# Patient Record
Sex: FEMALE | Race: WHITE | NOT HISPANIC OR LATINO | Employment: FULL TIME | URBAN - METROPOLITAN AREA
[De-identification: names, ages, dates, MRNs, and addresses within clinical notes are randomized per-mention and may not be internally consistent; named-entity substitution may affect disease eponyms.]

---

## 2018-10-30 ENCOUNTER — OFFICE VISIT (OUTPATIENT)
Dept: OBGYN CLINIC | Facility: CLINIC | Age: 48
End: 2018-10-30
Payer: COMMERCIAL

## 2018-10-30 VITALS — DIASTOLIC BLOOD PRESSURE: 78 MMHG | WEIGHT: 165 LBS | SYSTOLIC BLOOD PRESSURE: 122 MMHG

## 2018-10-30 DIAGNOSIS — N92.1 MENORRHAGIA WITH IRREGULAR CYCLE: ICD-10-CM

## 2018-10-30 DIAGNOSIS — Z01.419 ENCNTR FOR GYN EXAM (GENERAL) (ROUTINE) W/O ABN FINDINGS: ICD-10-CM

## 2018-10-30 PROCEDURE — G0145 SCR C/V CYTO,THINLAYER,RESCR: HCPCS | Performed by: PHYSICIAN ASSISTANT

## 2018-10-30 PROCEDURE — 99202 OFFICE O/P NEW SF 15 MIN: CPT | Performed by: PHYSICIAN ASSISTANT

## 2018-10-30 PROCEDURE — 87624 HPV HI-RISK TYP POOLED RSLT: CPT | Performed by: PHYSICIAN ASSISTANT

## 2018-10-30 PROCEDURE — 58100 BIOPSY OF UTERUS LINING: CPT | Performed by: PHYSICIAN ASSISTANT

## 2018-10-30 PROCEDURE — 88305 TISSUE EXAM BY PATHOLOGIST: CPT | Performed by: PATHOLOGY

## 2018-10-30 RX ORDER — HYDROXYCHLOROQUINE SULFATE 200 MG/1
200 TABLET, FILM COATED ORAL 2 TIMES DAILY
Refills: 3 | COMMUNITY
Start: 2018-08-15

## 2018-10-30 RX ORDER — TRAZODONE HYDROCHLORIDE 50 MG/1
150 TABLET ORAL DAILY
Refills: 3 | COMMUNITY
Start: 2018-10-22

## 2018-10-30 RX ORDER — DULOXETIN HYDROCHLORIDE 60 MG/1
60 CAPSULE, DELAYED RELEASE ORAL DAILY
Refills: 1 | COMMUNITY
Start: 2018-08-26

## 2018-10-30 RX ORDER — NIFEDIPINE 10 MG/1
10 CAPSULE ORAL DAILY
Refills: 6 | COMMUNITY
Start: 2018-10-22 | End: 2019-02-06 | Stop reason: ALTCHOICE

## 2018-10-30 RX ORDER — PREDNISONE 1 MG/1
5 TABLET ORAL DAILY
Refills: 1 | COMMUNITY
Start: 2018-10-23

## 2018-10-30 RX ORDER — TOPIRAMATE 50 MG/1
50 TABLET, FILM COATED ORAL 2 TIMES DAILY
Refills: 5 | COMMUNITY
Start: 2018-09-30

## 2018-10-30 RX ORDER — CYANOCOBALAMIN 1000 UG/ML
INJECTION INTRAMUSCULAR; SUBCUTANEOUS
Refills: 1 | COMMUNITY
Start: 2018-10-23

## 2018-10-30 RX ORDER — BELIMUMAB 200 MG/ML
SOLUTION SUBCUTANEOUS
COMMUNITY
Start: 2018-10-30

## 2018-10-30 NOTE — PROGRESS NOTES
Lakia Yeager  1970    S:  53 y o  female here for a problem visit  She has not been seen here for the past 9 years  She reports that her periods have always been regular and monthly, lasting 2 days of heavy bleeding where she will saturate a tampon every 1-2 hours  5 weeks ago she started with what seemed like a normal period in terms of flow and timing, however, by day 3 she was still bleeding and immediately knew something was wrong  She says she has bled daily for 5 weeks, saturating a tampon every hour, and passing grapefruit sized clots  She had some norethindrone 5mg tablets at home dated from 2007 and she started these two weeks ago by taking them BID x 1 week then QD x 1 week and now she is taking 1/2 tablet daily for the past two days; her bleeding stopped two days ago  She has some left pelvic discomfort/pressure feeling reminiscent of when she had an ovarian mass with elevated CA-125 in the past resulting in a RSO  She is sexually active with her  without pain or bleeding  She has had no Pap in 9 years       Past Medical History:   Diagnosis Date    Aneurysm ([address] )     Basal cell carcinoma     Circulatory disease     Fibromyalgia     Lupus      Family History   Problem Relation Age of Onset    Stroke Mother     Thyroid disease Mother     Rheum arthritis Mother     Heart disease Mother     Atrial fibrillation Mother     Skin cancer Mother     Hypertension Father     Diabetes Father      Social History     Social History    Marital status: /Civil Union     Spouse name: N/A    Number of children: N/A    Years of education: N/A     Social History Main Topics    Smoking status: Current Some Day Smoker    Smokeless tobacco: Never Used    Alcohol use Yes      Comment: socially     Drug use: No    Sexual activity: Not Currently     Partners: Male     Other Topics Concern    None     Social History Narrative    None       O:  /78 (BP Location: Right arm, Patient Position: Sitting, Cuff Size: Standard)   Wt 74 8 kg (165 lb)   LMP 09/21/2018   She appears well and is in no distress  Abdomen is soft and nontender  External genitals are normal without lesions or rashes  Vagina is normal with no bleeding or discharge noted  Cervix appears normal  Pap was obtained and large amount of bleeding was noted with endocervical brush  Uterus is nontender, no palpable masses  Uterus sounds to 8cm, moderate tissue obtained  Right adnexa is nontender, no masses palpable  Left is nodular, tender    We reviewed her normal CBC and TSH from 10/11 which was mid-way through this bleed  A/P:  Abnormal bleeding  Left pelvic pain  Check EMB, pelvic US, Pap with HPV  Finish her Aygestin, aware that she will likely have a bleed following that  If she continues to have abnormal bleeding will strongly pursue [address]

## 2018-11-01 LAB
HPV HR 12 DNA CVX QL NAA+PROBE: NEGATIVE
HPV16 DNA CVX QL NAA+PROBE: NEGATIVE
HPV18 DNA CVX QL NAA+PROBE: NEGATIVE

## 2018-11-02 ENCOUNTER — HOSPITAL ENCOUNTER (OUTPATIENT)
Dept: RADIOLOGY | Facility: HOSPITAL | Age: 48
Discharge: HOME | End: 2018-11-02
Payer: COMMERCIAL

## 2018-11-02 ENCOUNTER — TELEPHONE (OUTPATIENT)
Dept: OBGYN CLINIC | Facility: CLINIC | Age: 48
End: 2018-11-02

## 2018-11-02 DIAGNOSIS — N92.1 MENORRHAGIA WITH IRREGULAR CYCLE: ICD-10-CM

## 2018-11-02 PROCEDURE — 76856 US EXAM PELVIC COMPLETE: CPT

## 2018-11-02 PROCEDURE — 76830 TRANSVAGINAL US NON-OB: CPT

## 2018-11-02 NOTE — TELEPHONE ENCOUNTER
----- Message from Marissa Fajardo PA-C sent at 11/1/2018  6:57 PM EDT -----  Normal result, please call patient and give normal results

## 2018-11-05 ENCOUNTER — TELEPHONE (OUTPATIENT)
Dept: OBGYN CLINIC | Facility: CLINIC | Age: 48
End: 2018-11-05

## 2018-11-05 DIAGNOSIS — N83.209 CYST OF OVARY, UNSPECIFIED LATERALITY: Primary | ICD-10-CM

## 2018-11-05 LAB
LAB AP GYN PRIMARY INTERPRETATION: NORMAL
Lab: NORMAL

## 2018-11-05 NOTE — TELEPHONE ENCOUNTER
Tried to call pt number on file multiple times  Number does not work  Mailed letter to patient to call us back

## 2018-11-05 NOTE — TELEPHONE ENCOUNTER
----- Message from Aissatou Perry PA-C sent at 11/5/2018  7:31 AM EST -----  Please let Richard Schuster know that her ultrasound shows an ovarian cyst - I would like to repeat her ultrasound in 6-8 weeks to document resolution   Thx

## 2018-11-12 ENCOUNTER — TELEPHONE (OUTPATIENT)
Dept: OBGYN CLINIC | Facility: CLINIC | Age: 48
End: 2018-11-12

## 2018-11-12 NOTE — TELEPHONE ENCOUNTER
Her ultrasound shows a left ovarian cyst  This will cause cramping and discomfort until it resolves  If naproxen is not working, can try ibuprofen 800mg q 6 hours taken with food  Heat may also help  If she has unrelenting pain causing nausea and vomiting then she will need to go the ER  This cyst will hopefully resolve over time   Headaches and indigestion should not be related to her cyst 
Let's see how her bleeding does for the next month or two off of progesterone - if she continues to have abnormal bleeding she should let me know
Patient calling received a letter regarding her Ultrasound results, please call patient back 
Spoke with Pt today via phone call  Pt advised to monitor her bleeding for the next month or two off of the Progesterone - if she continues to have abnormal bleeding she should let PENNIE Lipscomb know per Cruz' recommendation  Reiterated to Pt that if she has any questions/concerns to contact office 
Spoke with patient  Patient stopped bleeding two days after she started the progesterone  She is still having severe cramping a 8 out of 10  She is also having headaches and indigestion  She has been taking tums and rolaids for this but no improvement  She is taking Naproxen BID but not helping at all  She wants to know your opinion on what to do for this  Aware of ultrasound results   Will go for 6 weeks follow-up on her cyst 
TREYSALOME    Spoke with Pt today via phone call  Pt informed that her recent ultrasound showed a left ovarian cyst per PENNIE Lipscomb  Pt further informed that said cyst will cause cramping and discomfort until it resolves, if Naproxen is not working can try Ibuprofen 800 mg every 6 hours taken with food as recommended by Cruz  Pt further informed that use of a heating pad may help as recommended by PENNIE Lipscomb  Pt informed that if she has unrelenting pain that is causing nausea and vomiting she should report to [facility] ER per PENNIE Lipscomb' recommendation  Pt further informed that said cyst will hopefully resolve over time, headaches and indigestion should not be related to her cyst   Pt further states she will schedule follow-up ultrasound  Pt states she is no longer bleeding since taking Progesterone  Pt further states she recently finished all of Progesterone  Pt states if PENNIE Lipscomb wants her to continue taking Progesterone, will need refill 
I will STOP taking the medications listed below when I get home from the hospital:  None

## 2018-11-26 ENCOUNTER — TELEPHONE (OUTPATIENT)
Dept: OBGYN CLINIC | Facility: CLINIC | Age: 48
End: 2018-11-26

## 2018-11-26 NOTE — TELEPHONE ENCOUNTER
Pt bleeding EXTREMELY heavy  She had put herself on norethindrone 10/15 and finished it around at about ov  (10/30)  She stopped bleeding about 2 weeks ago - then began again 11/22  Uses 10 pads and 20 tampons a day  Used that many yesterday  Is in bed  Is dizzy and lightheaded but says she can get like this - has Lupus also  Said she bled this badly last mo - then put herself on norethindrone  No nausea - is eating,  Catherine Tomas you told her hgb ok ? ? I cannot find it on chart    ER????  Thanks  Has l ovarian cyst s/p fall of horse today

## 2018-11-28 ENCOUNTER — TELEPHONE (OUTPATIENT)
Dept: OBGYN CLINIC | Facility: CLINIC | Age: 48
End: 2018-11-28

## 2018-12-17 ENCOUNTER — HOSPITAL ENCOUNTER (OUTPATIENT)
Dept: RADIOLOGY | Facility: MEDICAL CENTER | Age: 48
Discharge: HOME | End: 2018-12-17
Payer: COMMERCIAL

## 2018-12-17 DIAGNOSIS — N83.209 CYST OF OVARY, UNSPECIFIED LATERALITY: ICD-10-CM

## 2018-12-17 PROCEDURE — 76856 US EXAM PELVIC COMPLETE: CPT

## 2018-12-17 PROCEDURE — 76830 TRANSVAGINAL US NON-OB: CPT

## 2018-12-20 ENCOUNTER — TELEPHONE (OUTPATIENT)
Dept: OBGYN CLINIC | Facility: CLINIC | Age: 48
End: 2018-12-20

## 2018-12-20 ENCOUNTER — TELEPHONE (OUTPATIENT)
Dept: OBGYN CLINIC | Facility: MEDICAL CENTER | Age: 48
End: 2018-12-20

## 2018-12-20 DIAGNOSIS — N92.0 MENORRHAGIA WITH REGULAR CYCLE: Primary | ICD-10-CM

## 2018-12-20 RX ORDER — TRANEXAMIC ACID 650 1/1
TABLET ORAL
Qty: 30 TABLET | Refills: 3 | Status: SHIPPED | OUTPATIENT
Start: 2018-12-20 | End: 2019-02-06 | Stop reason: ALTCHOICE

## 2018-12-20 NOTE — TELEPHONE ENCOUNTER
----- Message from Kate Cha PA-C sent at 12/20/2018  7:47 AM EST -----  Please let Eva Patel know that her ovarian cyst has resolved

## 2018-12-20 NOTE — TELEPHONE ENCOUNTER
----- Message from Brie Bullard PA-C sent at 12/20/2018  7:47 AM EST -----  Please let Moraima Hickman know that her ovarian cyst has resolved

## 2018-12-20 NOTE — TELEPHONE ENCOUNTER
Pt angry because she said her cyst did not resolve according to the us tech, she was told its still there, told the pt the tech is not a radiologist and therefore should not have told her that, pt then asked to speak to luci, I told her I will have her call back, then she changed her mind and wanted to speak to a DR, explained Eboni Blevins was her provider but I will be happy to make apt with one of our dr's to go over this us  But she really just wants to speak to one, what to do  [place]

## 2018-12-20 NOTE — TELEPHONE ENCOUNTER
Spoke to pt  Reviewed resolution of ovarian cyst and only a corpus luteum seen on the left  She feels certain that she still has the cyst because she saw it on the ultrasound and the tech told her it was the same as last time  Again reviewed with pt results including my review of pictures showing a 1 4cm corpus luteum  Discussed corpus luteum in detail  She is upset because she had a "tumor" on her right ovary as a child and had it removed along with the ovary  She says her previous gynecologist told her that reflux/indigestion is a sign of ovarian cancer and she has a lot of indigestion  Advised I see no sign of ovarian cancer on her US    She is concerned because her periods remain heavy and crampy  She had a normal EMB and pelvic US  She says that norethindrone does stop her bleeding  Discussed norethindrone taken just for her periods is not recommended  Again offered Fabrizio Gamez - she is very interested in proceeding  Discussed Lysteda to slow her bleeding with her next impending menses - she is agreeable  Will order Fabrizio Gamez for pt and contact her when it is here

## 2018-12-26 ENCOUNTER — TELEPHONE (OUTPATIENT)
Dept: OBGYN CLINIC | Facility: CLINIC | Age: 48
End: 2018-12-26

## 2018-12-26 NOTE — TELEPHONE ENCOUNTER
Pt is interested in [facility] iud, please check iud coverage after Jan,2019    Thank you

## 2019-01-07 NOTE — TELEPHONE ENCOUNTER
Spoke with patient, unsure if she wants Carry Reus right now, will notify office is she is interested

## 2019-02-06 ENCOUNTER — OFFICE VISIT (OUTPATIENT)
Dept: OBGYN CLINIC | Facility: CLINIC | Age: 49
End: 2019-02-06
Payer: COMMERCIAL

## 2019-02-06 VITALS — WEIGHT: 165.6 LBS | DIASTOLIC BLOOD PRESSURE: 74 MMHG | SYSTOLIC BLOOD PRESSURE: 120 MMHG

## 2019-02-06 DIAGNOSIS — Z01.419 ENCOUNTER FOR GYNECOLOGICAL EXAMINATION WITHOUT ABNORMAL FINDING: ICD-10-CM

## 2019-02-06 DIAGNOSIS — N93.9 ABNORMAL UTERINE BLEEDING: ICD-10-CM

## 2019-02-06 DIAGNOSIS — Z12.39 SCREENING FOR MALIGNANT NEOPLASM OF BREAST: Primary | ICD-10-CM

## 2019-02-06 PROCEDURE — S0612 ANNUAL GYNECOLOGICAL EXAMINA: HCPCS | Performed by: OBSTETRICS & GYNECOLOGY

## 2019-02-06 RX ORDER — NIFEDIPINE 30 MG/1
30 TABLET, EXTENDED RELEASE ORAL DAILY
Refills: 6 | COMMUNITY
Start: 2019-01-07

## 2019-02-06 RX ORDER — ASPIRIN 325 MG
325 TABLET ORAL DAILY
COMMUNITY

## 2019-02-06 RX ORDER — MEDROXYPROGESTERONE ACETATE 150 MG/ML
150 INJECTION, SUSPENSION INTRAMUSCULAR
Qty: 1 ML | Refills: 3 | Status: SHIPPED | OUTPATIENT
Start: 2019-02-06

## 2019-02-06 NOTE — PROGRESS NOTES
Assessment/Plan:    No problem-specific Assessment & Plan notes found for this encounter  Diagnoses and all orders for this visit:    Screening for malignant neoplasm of breast  -     Mammo screening bilateral w cad; Future    Encounter for gynecological examination without abnormal finding    Other orders  -     NIFEdipine (PROCARDIA XL) 30 mg 24 hr tablet; Take 30 mg by mouth daily  -     aspirin 325 mg tablet; Take 325 mg by mouth daily        Annual examination was completed  Mammogram was ordered  We discussed patient keeping a menstrual calendar and following her menstrual patterns  She remains an excellent candidate for the use of tranexamic acid as well as progestin containing IUDs  Patient also raised a desire to restart Depo-Provera as an option  She has a history of having self dosed her intramuscular injection and I a m  certainly in agreement with her proceeding with that if she wishes a prescription was sent  I have asked the patient to call when she doses her injections so that we can keep her on scheduled she will dose this at a 12 week interval   Patient to return in 1 year or sooner if desired  Subjective:      Patient ID: Jacqueline Jarvis is a 53 y o  female  Patient presents for annual visit  Patient also with secondary issue of history of abnormal bleeding  She did have endometrial biopsy was demonstrated benign endometrium with polyp formation  Pelvic ultrasound was normal  She does have a history of a right salpingo-oophorectomy for a large ovarian mass at age 23  She has had some derangement of her menstrual pattern over this last year  Gynecologic Exam         The following portions of the patient's history were reviewed and updated as appropriate:   She  has a past medical history of Aneurysm ([address] ); Basal cell carcinoma; Circulatory disease; Fibromyalgia; and Lupus    She   Patient Active Problem List    Diagnosis Date Noted    Screening for malignant neoplasm of breast 02/06/2019    Encounter for gynecological examination without abnormal finding 02/06/2019     She  has a past surgical history that includes Tonsillectomy; Cholecystectomy; Ovary surgery; and Appendectomy  Her family history includes Atrial fibrillation in her mother; Diabetes in her father; Heart disease in her mother; Hypertension in her father; Rheum arthritis in her mother; Skin cancer in her mother; Stroke in her mother; Thyroid disease in her mother  She  reports that she has been smoking  She has never used smokeless tobacco  She reports that she drinks alcohol  She reports that she does not use drugs  Current Outpatient Prescriptions   Medication Sig Dispense Refill    aspirin 325 mg tablet Take 325 mg by mouth daily      BENLYSTA 200 MG/ML SOAJ       cyanocobalamin 1,000 mcg/mL INJECT EVERY WEEK, AS DIRECTED  1    DULoxetine (CYMBALTA) 60 mg delayed release capsule Take 60 mg by mouth daily  1    hydroxychloroquine (PLAQUENIL) 200 mg tablet Take 200 mg by mouth 2 (two) times a day  3    NIFEdipine (PROCARDIA XL) 30 mg 24 hr tablet Take 30 mg by mouth daily  6    predniSONE 5 mg tablet Take 5 mg by mouth daily  1    topiramate (TOPAMAX) 50 MG tablet Take 50 mg by mouth 2 (two) times a day  5    traZODone (DESYREL) 50 mg tablet Take 150 mg by mouth daily  3     No current facility-administered medications for this visit        Current Outpatient Prescriptions on File Prior to Visit   Medication Sig    BENLYSTA 200 MG/ML SOAJ     cyanocobalamin 1,000 mcg/mL INJECT EVERY WEEK, AS DIRECTED    DULoxetine (CYMBALTA) 60 mg delayed release capsule Take 60 mg by mouth daily    hydroxychloroquine (PLAQUENIL) 200 mg tablet Take 200 mg by mouth 2 (two) times a day    predniSONE 5 mg tablet Take 5 mg by mouth daily    topiramate (TOPAMAX) 50 MG tablet Take 50 mg by mouth 2 (two) times a day    traZODone (DESYREL) 50 mg tablet Take 150 mg by mouth daily    [DISCONTINUED] NIFEdipine (PROCARDIA) 10 mg capsule Take 10 mg by mouth daily    [DISCONTINUED] Tranexamic Acid (LYSTEDA) 650 MG TABS Two po TID     No current facility-administered medications on file prior to visit  She is allergic to morphine       Review of Systems   All other systems reviewed and are negative  Objective:      /74 (BP Location: Left arm, Patient Position: Sitting, Cuff Size: Standard)   Wt 75 1 kg (165 lb 9 6 oz)   LMP 01/25/2019 (Exact Date)          Physical Exam   Constitutional: She is oriented to person, place, and time  She appears well-developed and well-nourished  HENT:   Head: Normocephalic and atraumatic  Nose: Nose normal    Eyes: Pupils are equal, round, and reactive to light  EOM are normal    Neck: Normal range of motion  Neck supple  No thyromegaly present  Cardiovascular: Normal rate and regular rhythm  Pulmonary/Chest: Effort normal and breath sounds normal  No respiratory distress  Breasts no masses, tenderness, skin changes   Abdominal: Soft  Bowel sounds are normal  She exhibits no distension and no mass  There is no tenderness  Hernia confirmed negative in the right inguinal area and confirmed negative in the left inguinal area  Genitourinary: Vagina normal and uterus normal  No breast swelling, tenderness, discharge or bleeding  Pelvic exam was performed with patient supine  No labial fusion  There is no rash, tenderness, lesion or injury on the right labia  There is no rash, tenderness, lesion or injury on the left labia  Cervix exhibits no motion tenderness, no discharge and no friability  Genitourinary Comments: Ext genitalia nl female w/o lesions  Vag healthy without lesions or discharge  Cervix healthy w/o lesions or discharge  uterus nl size, NT, no mass  Adnexa NT, no mass   Musculoskeletal: Normal range of motion  She exhibits no edema  Lymphadenopathy:        Right: No inguinal adenopathy present  Left: No inguinal adenopathy present     Neurological: She is alert and oriented to person, place, and time  She has normal reflexes  Skin: Skin is warm and dry  No rash noted  Psychiatric: She has a normal mood and affect  Her behavior is normal  Thought content normal    Nursing note and vitals reviewed

## 2022-06-05 ENCOUNTER — OFFICE VISIT (OUTPATIENT)
Dept: URGENT CARE | Facility: MEDICAL CENTER | Age: 52
End: 2022-06-05
Payer: COMMERCIAL

## 2022-06-05 VITALS
HEART RATE: 80 BPM | HEIGHT: 66 IN | SYSTOLIC BLOOD PRESSURE: 116 MMHG | RESPIRATION RATE: 18 BRPM | TEMPERATURE: 97.6 F | OXYGEN SATURATION: 98 % | BODY MASS INDEX: 24.91 KG/M2 | WEIGHT: 155 LBS | DIASTOLIC BLOOD PRESSURE: 72 MMHG

## 2022-06-05 DIAGNOSIS — M25.559 HIP PAIN: Primary | ICD-10-CM

## 2022-06-05 PROCEDURE — 99212 OFFICE O/P EST SF 10 MIN: CPT | Performed by: PHYSICIAN ASSISTANT

## 2022-06-05 RX ORDER — SILDENAFIL CITRATE 20 MG/1
TABLET ORAL
COMMUNITY
Start: 2022-04-18

## 2022-06-05 RX ORDER — PREDNISONE 20 MG/1
20 TABLET ORAL 2 TIMES DAILY WITH MEALS
Qty: 10 TABLET | Refills: 0 | Status: SHIPPED | OUTPATIENT
Start: 2022-06-05 | End: 2022-06-10

## 2022-06-05 RX ORDER — CYCLOBENZAPRINE HCL 5 MG
5 TABLET ORAL
COMMUNITY
Start: 2022-04-22

## 2022-06-05 NOTE — PATIENT INSTRUCTIONS
1  Take prednisone 20mg  Twice daily x 5 days  2  Recommend consult with Rheumatology- info provided  3  ER if symptoms worsen

## 2022-06-05 NOTE — PROGRESS NOTES
[address] [facility]        NAME: Ewelina Suarez is a 49 y o  female  : 1970    MRN: [id number]  DATE: 2022  TIME: 6:56 PM    Assessment and Plan   Hip pain [M25 349]  1  Hip pain  Ambulatory Referral to Rheumatology    predniSONE 20 mg tablet         Patient Instructions     1  Take prednisone 20mg  Twice daily x 5 days  2  Recommend consult with Rheumatology- info provided  3  ER if symptoms worsen  Chief Complaint     Chief Complaint   Patient presents with    Hip Pain     Patient has bilateral hip pain; patient states she has a hx of lupus and scleroderma; rheumatoid doctor is [place] and patient has not been able to get steroid injections    Medication Refill     Patient has lupus, scleroderma and doctor is unable to fill nifedipine 30mg 24 h, flexeril, prednisone 10mg 1x daily          History of Present Illness       Jeanine Peacock is a 73-year-old female presents with bilateral increasing hip pain that started over the past week  Patient reports she has a history of lupus and scleroderma, she generally receives steroid injections to her hips for pain relief  Patient reports she has not been able to get in contact with her rheumatologist, she is taking 10 mg of prednisone daily but her symptoms have worsened  Patient denies any weakness throughout her lower legs, she reports no numbness or tingling  She reports her pain level is currently 10/10  Hip Pain   Pertinent negatives include no numbness  Medication Refill  Associated symptoms include a rash  Pertinent negatives include no joint swelling, numbness or weakness  Review of Systems   Review of Systems   Constitutional: Negative  Musculoskeletal: Positive for gait problem  Negative for joint swelling  Skin: Positive for rash  Neurological: Negative for weakness and numbness           Current Medications       Current Outpatient Medications:     aspirin 325 mg tablet, Take 325 mg by mouth daily, Disp: , Rfl:   BENLYSTA 200 MG/ML SOAJ, , Disp: , Rfl:     DULoxetine (CYMBALTA) 60 mg delayed release capsule, Take 60 mg by mouth daily, Disp: , Rfl: 1    hydroxychloroquine (PLAQUENIL) 200 mg tablet, Take 200 mg by mouth 2 (two) times a day, Disp: , Rfl: 3    NIFEdipine (PROCARDIA XL) 30 mg 24 hr tablet, Take 30 mg by mouth daily, Disp: , Rfl: 6    predniSONE 20 mg tablet, Take 1 tablet (20 mg total) by mouth 2 (two) times a day with meals for 5 days, Disp: 10 tablet, Rfl: 0    predniSONE 5 mg tablet, Take 5 mg by mouth daily, Disp: , Rfl: 1    topiramate (TOPAMAX) 50 MG tablet, Take 50 mg by mouth 2 (two) times a day, Disp: , Rfl: 5    cyanocobalamin 1,000 mcg/mL, INJECT EVERY WEEK, AS DIRECTED (Patient not taking: Reported on 6/5/2022), Disp: , Rfl: 1    cyclobenzaprine (FLEXERIL) 5 mg tablet, Take 5 mg by mouth daily at bedtime, Disp: , Rfl:     medroxyPROGESTERone (DEPO-PROVERA) 150 mg/mL injection, Inject 1 mL (150 mg total) into a muscle every 3 (three) months (Patient not taking: Reported on 6/5/2022), Disp: 1 mL, Rfl: 3    sildenafil (REVATIO) 20 mg tablet, , Disp: , Rfl:     traZODone (DESYREL) 50 mg tablet, Take 150 mg by mouth daily (Patient not taking: Reported on 6/5/2022), Disp: , Rfl: 3    Current Allergies     Allergies as of 06/05/2022 - Reviewed 06/05/2022   Allergen Reaction Noted    Morphine Hives     Penicillins Rash 06/05/2022            The following portions of the patient's history were reviewed and updated as appropriate: allergies, current medications, past family history, past medical history, past social history, past surgical history and problem list      Past Medical History:   Diagnosis Date    [address] [address]     Basal cell carcinoma     Circulatory disease     Fibromyalgia     Lupus [address] [address]     Scleroderma ([facility] [address]        Past Surgical History:   Procedure Laterality Date    APPENDECTOMY      CHOLECYSTECTOMY      OVARY SURGERY      TONSILLECTOMY         Family History Problem Relation Age of Onset    Stroke Mother     Thyroid disease Mother     Rheum arthritis Mother     Heart disease Mother     Atrial fibrillation Mother     Skin cancer Mother     Hypertension Father     Diabetes Father          Medications have been verified  Objective   /72   Pulse 80   Temp 97 6 °F (36 4 °C) (Temporal)   Resp 18   Ht 5' 6" (1 676 m)   Wt 70 3 kg (155 lb)   SpO2 98%   BMI 25 02 kg/m²   No LMP recorded  Physical Exam     Physical Exam  Constitutional:       General: She is not in acute distress  Appearance: Normal appearance  She is not ill-appearing  Cardiovascular:      Rate and Rhythm: Normal rate and regular rhythm  Heart sounds: Normal heart sounds  No murmur heard  Pulmonary:      Effort: Pulmonary effort is normal       Breath sounds: Normal breath sounds  Musculoskeletal:      Right hip: Tenderness and bony tenderness present  Decreased range of motion  Normal strength  Left hip: Tenderness and bony tenderness present  Decreased range of motion  Decreased strength  Neurological:      Mental Status: She is alert